# Patient Record
Sex: MALE
[De-identification: names, ages, dates, MRNs, and addresses within clinical notes are randomized per-mention and may not be internally consistent; named-entity substitution may affect disease eponyms.]

---

## 2019-02-26 ENCOUNTER — TRANSCRIPTION ENCOUNTER (OUTPATIENT)
Age: 54
End: 2019-02-26

## 2019-03-06 ENCOUNTER — TRANSCRIPTION ENCOUNTER (OUTPATIENT)
Age: 54
End: 2019-03-06

## 2019-03-10 ENCOUNTER — TRANSCRIPTION ENCOUNTER (OUTPATIENT)
Age: 54
End: 2019-03-10

## 2019-03-16 ENCOUNTER — TRANSCRIPTION ENCOUNTER (OUTPATIENT)
Age: 54
End: 2019-03-16

## 2020-01-07 ENCOUNTER — TRANSCRIPTION ENCOUNTER (OUTPATIENT)
Age: 55
End: 2020-01-07

## 2020-12-08 ENCOUNTER — TRANSCRIPTION ENCOUNTER (OUTPATIENT)
Age: 55
End: 2020-12-08

## 2021-02-28 ENCOUNTER — TRANSCRIPTION ENCOUNTER (OUTPATIENT)
Age: 56
End: 2021-02-28

## 2021-04-24 ENCOUNTER — OUTPATIENT (OUTPATIENT)
Dept: OUTPATIENT SERVICES | Facility: HOSPITAL | Age: 56
LOS: 1 days | End: 2021-04-24
Payer: MEDICARE

## 2021-04-24 DIAGNOSIS — Z20.828 CONTACT WITH AND (SUSPECTED) EXPOSURE TO OTHER VIRAL COMMUNICABLE DISEASES: ICD-10-CM

## 2021-04-24 LAB — SARS-COV-2 RNA SPEC QL NAA+PROBE: SIGNIFICANT CHANGE UP

## 2021-04-24 PROCEDURE — C9803: CPT

## 2021-04-24 PROCEDURE — U0003: CPT

## 2021-04-24 PROCEDURE — U0005: CPT

## 2021-04-25 DIAGNOSIS — Z20.828 CONTACT WITH AND (SUSPECTED) EXPOSURE TO OTHER VIRAL COMMUNICABLE DISEASES: ICD-10-CM

## 2021-07-20 ENCOUNTER — TRANSCRIPTION ENCOUNTER (OUTPATIENT)
Age: 56
End: 2021-07-20

## 2021-09-01 ENCOUNTER — TRANSCRIPTION ENCOUNTER (OUTPATIENT)
Age: 56
End: 2021-09-01

## 2021-09-20 ENCOUNTER — TRANSCRIPTION ENCOUNTER (OUTPATIENT)
Age: 56
End: 2021-09-20

## 2021-10-28 ENCOUNTER — TRANSCRIPTION ENCOUNTER (OUTPATIENT)
Age: 56
End: 2021-10-28

## 2022-03-11 ENCOUNTER — TRANSCRIPTION ENCOUNTER (OUTPATIENT)
Age: 57
End: 2022-03-11

## 2022-04-14 PROBLEM — Z00.00 ENCOUNTER FOR PREVENTIVE HEALTH EXAMINATION: Status: ACTIVE | Noted: 2022-04-14

## 2022-04-19 DIAGNOSIS — M19.019 PRIMARY OSTEOARTHRITIS, UNSPECIFIED SHOULDER: ICD-10-CM

## 2022-06-29 ENCOUNTER — APPOINTMENT (OUTPATIENT)
Dept: ORTHOPEDIC SURGERY | Facility: CLINIC | Age: 57
End: 2022-06-29

## 2022-06-29 PROCEDURE — 72040 X-RAY EXAM NECK SPINE 2-3 VW: CPT

## 2022-06-29 PROCEDURE — 99214 OFFICE O/P EST MOD 30 MIN: CPT

## 2022-06-29 NOTE — HISTORY OF PRESENT ILLNESS
[Neck] : neck [Gradual] : gradual [Sudden] : sudden [6] : 6 [Burning] : burning [Shooting] : shooting [Stabbing] : stabbing [Intermittent] : intermittent [Exercising] : exercising [Full time] : Work status: full time [de-identified] : He is retired [employer] continues with neck pain and tingling down left upper extremity. He is also known to have left shoulder oa with impingement and calcific tendinitis. He does well wit intermittent therapy and recently he has been more sore would like to get back into therapy.\par \par 8/20/18: Here to follow up,. Plan at last visit was "We will get him back into therapy.\par For the neck possible milad if symptoms warrant. for the shoulder discussed the role of prn cortisone if symptoms warrant" Increased paresthesias to bilateral hands recently at night, all 5 digits.\par \par 4/15/19: Here to follow up. Plan at last visit was"Will rpt MRI due to progressing symptoms\par Will get EMG upper extremities to eval paresthesias.\par Continue with PT and follow up after tests." The neck pain and UE symptoms have fluctuated at bit in the - having numbness in the hands - the arms were quite bothersome but the symptoms have subsided a bit - the symptoms persists\par \par MRI c-spine 8/18: Multilevel spondylosis most pronounced at C5-C6 for which there is a diffuse disc bulge\par mildly narrowing the spinal canal and contributing to moderate left and moderate to severe\par right-sided neural foraminal narrowing.\par Severe right-sided neural foraminal at C6-C7 secondary to a right lateral/foraminal focal\par disc herniation\par \par 5/20/19: Here for f/u - plan at last was "Fluctuating symptoms - would order updted MRi C spine and repeat course of PT - Mobic script as well" - overall doing about the same in the regards in the neck - slight tingling in the arms/fingers - has been going to PT for the lower back as well\par \par the lower back is bothering him and shooting down the legs - not responding to PT\par \par MRI C spine - Left-sided disc/ridge complex C3-4 with encroachment upon left C4 nerve root and mild\par spinal stenosis.\par Central disc herniation C4-5 with mild spinal stenosis.\par Spondylitic change C5-6 with cord flattening and moderate spinal stenosis as well as\par marked right and lesser left neuroforaminal compromise. There is suspected right C6 nerve\par root impingement as well as encroachment if not impingement upon the left C6 nerve root.\par Small right foraminal disc herniation C6-7 with possible right C7 nerve root impingement\par \par 10/28/19: Here for fu neck and low back, pain LB is worse, neck is improved. He continues to have pain with activity, rest from PT treatment and Mobic has not helped. No radiating symptoms, b/b dysfunction.\par \par MRI Lumbar spine - [facility] 6/20/19\par Multilevel DDD with HNP, there is a slight progression L5-S1.\par \par 6/27/20: Plan last visit "MRI findings reviewed, will continue PT which has helped him manage symptoms, pain mgmt. consult for possible spine injections; Naprosyn; follow up 4 weeks re-eval."\par \par Had been doing relatively OK, though he hit his neck on his daughter's swing set now with more stiffness and n/t into both hands. Driving difficult. Mobic has helped in the past - Naproxen, not so much. Slight headaches, may have equilibrium issues. The back is status quo.\par \par 7/6/20: Here fu neck - his neck worse and having symptoms down the arms\par \par MRI C spine- Progressive type I Modic endplate changes at C5-C6. Otherwise no change since May 2019.\par There is multilevel cervical spondylosis with findings most notable for moderate central\par stenosis and cord compression at C5-C6, grossly unchanged.\par Multilevel foraminal stenosis with impingement of the bilateral C4, bilateral C6 and right\par C7 nerve roots.\par \par 8/17/20: Here for fu - plan at last was "reviewed the updated MRi with him -MDP/PT\par discussed would rec surgery if not improving with conservative tx" -overall doing better with traction at this point - arms okay - no loss of fine motor or bb control - neck pain to the right shoulder\par \par 10/5/20: Here to follow up. Plan at last visit was "reviewed the updated MRi with him again today- hes doing better with PT - discussion that if not improving rec acdf "\par \par 1/6/21- He remains at his baseline level of pain and discomfort. He notes episodic radicular complaints he has been maintained with 1-2 visits of therapy per week and would like to continue. Has Norco does not need renewal\par He states recently his left shoulder has been more problematic and is requesting injection\par \par 3/31/21: His shoulder symptoms remain unchanged. He states the CSI from last visit was helpful.\par \par 5/19/21: Here for fu - plan at last was shoulder eval - the neck pain remains at this point - saw Dr Huertas and had aspiration - symptoms stable - PT helps\par \par 6/29/22: Here for follow up C-Spine. plan at last "symptoms relatively stable - reviewed the last MRI - fu 6 months - will observer - cont witH pt- MAY need further eval with MRi but would hold on that"\par Today notes intermittent numbness/ tingling sensation has returned in his Left arm at night. Continues HEP. Denies new injury/ new weakness. Interested in resuming PT and getting an updated MRI. \par \par tried yoga \par \par No fine motor loss \par No loss of balance \par \par xrays today:\par C spine - loss of disc hieght at C5-6  [] : Post Surgical Visit: no [FreeTextEntry1] : c spine  [FreeTextEntry7] : down into both arms  [de-identified] : None

## 2022-06-29 NOTE — DISCUSSION/SUMMARY
[de-identified] : indicated for MRI of the C spine \par discussed there could be component of carpal tunnel syndrome \par \par fu to review the MRi

## 2022-06-29 NOTE — PHYSICAL EXAM
[Extension] : extension [Rotation to left] : rotation to left [Rotation to right] : rotation to right [] : no ecchymosis

## 2022-07-26 ENCOUNTER — RESULT REVIEW (OUTPATIENT)
Age: 57
End: 2022-07-26

## 2022-08-26 ENCOUNTER — APPOINTMENT (OUTPATIENT)
Dept: ORTHOPEDIC SURGERY | Facility: CLINIC | Age: 57
End: 2022-08-26

## 2022-08-26 VITALS — HEIGHT: 74 IN | BODY MASS INDEX: 26.31 KG/M2 | WEIGHT: 205 LBS

## 2022-08-26 PROCEDURE — 99214 OFFICE O/P EST MOD 30 MIN: CPT

## 2022-08-26 NOTE — DISCUSSION/SUMMARY
[de-identified] : reviewed the MRi with him \par no significant change \par discussion of surgery - hes neurologically intact - discussion of observatoin risk \par hes going to observe and treat with\par PT/ mobic

## 2022-08-26 NOTE — HISTORY OF PRESENT ILLNESS
[Neck] : neck [Gradual] : gradual [Sudden] : sudden [6] : 6 [Burning] : burning [Shooting] : shooting [Stabbing] : stabbing [Intermittent] : intermittent [Exercising] : exercising [Full time] : Work status: full time [de-identified] : He is retired [employer] continues with neck pain and tingling down left upper extremity. He is also known to have left shoulder oa with impingement and calcific tendinitis. He does well wit intermittent therapy and recently he has been more sore would like to get back into therapy.\par \par 8/20/18: Here to follow up,. Plan at last visit was "We will get him back into therapy.\par For the neck possible milad if symptoms warrant. for the shoulder discussed the role of prn cortisone if symptoms warrant" Increased paresthesias to bilateral hands recently at night, all 5 digits.\par \par 4/15/19: Here to follow up. Plan at last visit was"Will rpt MRI due to progressing symptoms\par Will get EMG upper extremities to eval paresthesias.\par Continue with PT and follow up after tests." The neck pain and UE symptoms have fluctuated at bit in the - having numbness in the hands - the arms were quite bothersome but the symptoms have subsided a bit - the symptoms persists\par \par MRI c-spine 8/18: Multilevel spondylosis most pronounced at C5-C6 for which there is a diffuse disc bulge\par mildly narrowing the spinal canal and contributing to moderate left and moderate to severe\par right-sided neural foraminal narrowing.\par Severe right-sided neural foraminal at C6-C7 secondary to a right lateral/foraminal focal\par disc herniation\par \par 5/20/19: Here for f/u - plan at last was "Fluctuating symptoms - would order updted MRi C spine and repeat course of PT - Mobic script as well" - overall doing about the same in the regards in the neck - slight tingling in the arms/fingers - has been going to PT for the lower back as well\par \par the lower back is bothering him and shooting down the legs - not responding to PT\par \par MRI C spine - Left-sided disc/ridge complex C3-4 with encroachment upon left C4 nerve root and mild\par spinal stenosis.\par Central disc herniation C4-5 with mild spinal stenosis.\par Spondylitic change C5-6 with cord flattening and moderate spinal stenosis as well as\par marked right and lesser left neuroforaminal compromise. There is suspected right C6 nerve\par root impingement as well as encroachment if not impingement upon the left C6 nerve root.\par Small right foraminal disc herniation C6-7 with possible right C7 nerve root impingement\par \par 10/28/19: Here for fu neck and low back, pain LB is worse, neck is improved. He continues to have pain with activity, rest from PT treatment and Mobic has not helped. No radiating symptoms, b/b dysfunction.\par \par MRI Lumbar spine - [facility] 6/20/19\par Multilevel DDD with HNP, there is a slight progression L5-S1.\par \par 6/27/20: Plan last visit "MRI findings reviewed, will continue PT which has helped him manage symptoms, pain mgmt. consult for possible spine injections; Naprosyn; follow up 4 weeks re-eval."\par \par Had been doing relatively OK, though he hit his neck on his daughter's swing set now with more stiffness and n/t into both hands. Driving difficult. Mobic has helped in the past - Naproxen, not so much. Slight headaches, may have equilibrium issues. The back is status quo.\par \par 7/6/20: Here fu neck - his neck worse and having symptoms down the arms\par \par MRI C spine- Progressive type I Modic endplate changes at C5-C6. Otherwise no change since May 2019.\par There is multilevel cervical spondylosis with findings most notable for moderate central\par stenosis and cord compression at C5-C6, grossly unchanged.\par Multilevel foraminal stenosis with impingement of the bilateral C4, bilateral C6 and right\par C7 nerve roots.\par \par 8/17/20: Here for fu - plan at last was "reviewed the updated MRi with him -MDP/PT\par discussed would rec surgery if not improving with conservative tx" -overall doing better with traction at this point - arms okay - no loss of fine motor or bb control - neck pain to the right shoulder\par \par 10/5/20: Here to follow up. Plan at last visit was "reviewed the updated MRi with him again today- hes doing better with PT - discussion that if not improving rec acdf "\par \par 1/6/21- He remains at his baseline level of pain and discomfort. He notes episodic radicular complaints he has been maintained with 1-2 visits of therapy per week and would like to continue. Has Norco does not need renewal\par He states recently his left shoulder has been more problematic and is requesting injection\par \par 3/31/21: His shoulder symptoms remain unchanged. He states the CSI from last visit was helpful.\par \par 5/19/21: Here for fu - plan at last was shoulder eval - the neck pain remains at this point - saw Dr Huertas and had aspiration - symptoms stable - PT helps\par \par 6/29/22: Here for follow up C-Spine. plan at last "symptoms relatively stable - reviewed the last MRI - fu 6 months - will observer - cont witH pt- MAY need further eval with MRi but would hold on that"\par Today notes intermittent numbness/ tingling sensation has returned in his Left arm at night. Continues HEP. Denies new injury/ new weakness. Interested in resuming PT and getting an updated MRI. \par \par tried yoga \par \par No fine motor loss \par No loss of balance \par \par xrays today:\par C spine - loss of disc hieght at C5-6 \par \par 8/26/22: Here for fu - plan at last was "indicated for MRI of the C spine \par discussed there could be component of carpal tunnel syndrome" - overall the pain remains - has some arm issues - not as bad as last time - neck rom is limited\par \par MRi C spine - Straightening of the cervical lordosis.\par Disc bulge with minimal superimposed central disc herniation C3-4. There is\par facet arthropathy and uncovertebral joint degenerative change resulting in left\par with a right foraminal compromise, left C4 nerve root impingement and\par encroachment upon the right C4 nerve root. There is moderate spinal stenosis.\par Small central disc herniation C4-5 with mild spinal stenosis.\par race posterior subluxation and spondylitic change C5/6 with moderate spinal\par stenosis, bilateral foraminal stenosis and encroachment if not impingement upon\par the C6 nerve roots bilaterally.\par Chronic right foraminal disc herniation C6/7 impinging upon the right C7 nerve\par root. [] : Post Surgical Visit: no [FreeTextEntry1] : c spine  [FreeTextEntry7] : down into both arms  [de-identified] : None

## 2022-11-02 ENCOUNTER — NON-APPOINTMENT (OUTPATIENT)
Age: 57
End: 2022-11-02

## 2022-11-27 ENCOUNTER — RX RENEWAL (OUTPATIENT)
Age: 57
End: 2022-11-27

## 2023-03-24 ENCOUNTER — APPOINTMENT (OUTPATIENT)
Dept: ORTHOPEDIC SURGERY | Facility: CLINIC | Age: 58
End: 2023-03-24
Payer: MEDICARE

## 2023-03-24 VITALS — HEIGHT: 74 IN | WEIGHT: 215 LBS | BODY MASS INDEX: 27.59 KG/M2

## 2023-03-24 DIAGNOSIS — M54.12 RADICULOPATHY, CERVICAL REGION: ICD-10-CM

## 2023-03-24 PROCEDURE — 99214 OFFICE O/P EST MOD 30 MIN: CPT

## 2023-03-24 NOTE — HISTORY OF PRESENT ILLNESS
[Neck] : neck [Gradual] : gradual [Sudden] : sudden [6] : 6 [Burning] : burning [Shooting] : shooting [Stabbing] : stabbing [Intermittent] : intermittent [Exercising] : exercising [Full time] : Work status: full time [de-identified] : He is retired [employer] continues with neck pain and tingling down left upper extremity. He is also known to have left shoulder oa with impingement and calcific tendinitis. He does well wit intermittent therapy and recently he has been more sore would like to get back into therapy.\par \par 8/20/18: Here to follow up,. Plan at last visit was "We will get him back into therapy.\par For the neck possible milad if symptoms warrant. for the shoulder discussed the role of prn cortisone if symptoms warrant" Increased paresthesias to bilateral hands recently at night, all 5 digits.\par \par 4/15/19: Here to follow up. Plan at last visit was"Will rpt MRI due to progressing symptoms\par Will get EMG upper extremities to eval paresthesias.\par Continue with PT and follow up after tests." The neck pain and UE symptoms have fluctuated at bit in the - having numbness in the hands - the arms were quite bothersome but the symptoms have subsided a bit - the symptoms persists\par \par MRI c-spine 8/18: Multilevel spondylosis most pronounced at C5-C6 for which there is a diffuse disc bulge\par mildly narrowing the spinal canal and contributing to moderate left and moderate to severe\par right-sided neural foraminal narrowing.\par Severe right-sided neural foraminal at C6-C7 secondary to a right lateral/foraminal focal\par disc herniation\par \par 5/20/19: Here for f/u - plan at last was "Fluctuating symptoms - would order updted MRi C spine and repeat course of PT - Mobic script as well" - overall doing about the same in the regards in the neck - slight tingling in the arms/fingers - has been going to PT for the lower back as well\par \par the lower back is bothering him and shooting down the legs - not responding to PT\par \par MRI C spine - Left-sided disc/ridge complex C3-4 with encroachment upon left C4 nerve root and mild\par spinal stenosis.\par Central disc herniation C4-5 with mild spinal stenosis.\par Spondylitic change C5-6 with cord flattening and moderate spinal stenosis as well as\par marked right and lesser left neuroforaminal compromise. There is suspected right C6 nerve\par root impingement as well as encroachment if not impingement upon the left C6 nerve root.\par Small right foraminal disc herniation C6-7 with possible right C7 nerve root impingement\par \par 10/28/19: Here for fu neck and low back, pain LB is worse, neck is improved. He continues to have pain with activity, rest from PT treatment and Mobic has not helped. No radiating symptoms, b/b dysfunction.\par \par MRI Lumbar spine - [facility] 6/20/19\par Multilevel DDD with HNP, there is a slight progression L5-S1.\par \par 6/27/20: Plan last visit "MRI findings reviewed, will continue PT which has helped him manage symptoms, pain mgmt. consult for possible spine injections; Naprosyn; follow up 4 weeks re-eval."\par \par Had been doing relatively OK, though he hit his neck on his daughter's swing set now with more stiffness and n/t into both hands. Driving difficult. Mobic has helped in the past - Naproxen, not so much. Slight headaches, may have equilibrium issues. The back is status quo.\par \par 7/6/20: Here fu neck - his neck worse and having symptoms down the arms\par \par MRI C spine- Progressive type I Modic endplate changes at C5-C6. Otherwise no change since May 2019.\par There is multilevel cervical spondylosis with findings most notable for moderate central\par stenosis and cord compression at C5-C6, grossly unchanged.\par Multilevel foraminal stenosis with impingement of the bilateral C4, bilateral C6 and right\par C7 nerve roots.\par \par 8/17/20: Here for fu - plan at last was "reviewed the updated MRi with him -MDP/PT\par discussed would rec surgery if not improving with conservative tx" -overall doing better with traction at this point - arms okay - no loss of fine motor or bb control - neck pain to the right shoulder\par \par 10/5/20: Here to follow up. Plan at last visit was "reviewed the updated MRi with him again today- hes doing better with PT - discussion that if not improving rec acdf "\par \par 1/6/21- He remains at his baseline level of pain and discomfort. He notes episodic radicular complaints he has been maintained with 1-2 visits of therapy per week and would like to continue. Has Norco does not need renewal\par He states recently his left shoulder has been more problematic and is requesting injection\par \par 3/31/21: His shoulder symptoms remain unchanged. He states the CSI from last visit was helpful.\par \par 5/19/21: Here for fu - plan at last was shoulder eval - the neck pain remains at this point - saw Dr Huertas and had aspiration - symptoms stable - PT helps\par \par 6/29/22: Here for follow up C-Spine. plan at last "symptoms relatively stable - reviewed the last MRI - fu 6 months - will observer - cont witH pt- MAY need further eval with MRi but would hold on that"\par Today notes intermittent numbness/ tingling sensation has returned in his Left arm at night. Continues HEP. Denies new injury/ new weakness. Interested in resuming PT and getting an updated MRI. \par \par tried yoga \par \par No fine motor loss \par No loss of balance \par \par xrays today:\par C spine - loss of disc hieght at C5-6 \par \par 8/26/22: Here for fu - plan at last was "indicated for MRI of the C spine \par discussed there could be component of carpal tunnel syndrome" - overall the pain remains - has some arm issues - not as bad as last time - neck rom is limited\par \par MRi C spine - Straightening of the cervical lordosis.\par Disc bulge with minimal superimposed central disc herniation C3-4. There is\par facet arthropathy and uncovertebral joint degenerative change resulting in left\par with a right foraminal compromise, left C4 nerve root impingement and\par encroachment upon the right C4 nerve root. There is moderate spinal stenosis.\par Small central disc herniation C4-5 with mild spinal stenosis.\par race posterior subluxation and spondylitic change C5/6 with moderate spinal\par stenosis, bilateral foraminal stenosis and encroachment if not impingement upon\par the C6 nerve roots bilaterally.\par Chronic right foraminal disc herniation C6/7 impinging upon the right C7 nerve\par root.\par \par 3/24/23: here for pain on the right side of the back of the neck\par hasn’t been to PT in a while\par mobic helps a bit  [] : Post Surgical Visit: no [FreeTextEntry1] : c spine  [FreeTextEntry5] : Pt is here for c-spine follow up. Pain comes and goes, about the same as last visit. Pain is now on the right side of the neck.  [FreeTextEntry7] : down into both arms  [de-identified] : Meloxicam, PT

## 2023-03-24 NOTE — DISCUSSION/SUMMARY
[de-identified] : reviewed the MRi with him and the case \par symptoms relatively static \par discussion of surgery - hes neurologically intact - discussion of observatoin risk \par hes going to observe and treat with\par PT/diclofenac\par fu 3 months

## 2023-04-25 ENCOUNTER — RX RENEWAL (OUTPATIENT)
Age: 58
End: 2023-04-25

## 2023-04-25 RX ORDER — DICLOFENAC SODIUM 75 MG/1
75 TABLET, DELAYED RELEASE ORAL
Qty: 60 | Refills: 0 | Status: ACTIVE | COMMUNITY
Start: 2023-03-24 | End: 1900-01-01

## 2023-07-14 ENCOUNTER — APPOINTMENT (OUTPATIENT)
Dept: ORTHOPEDIC SURGERY | Facility: CLINIC | Age: 58
End: 2023-07-14

## 2023-09-27 ENCOUNTER — APPOINTMENT (OUTPATIENT)
Dept: ORTHOPEDIC SURGERY | Facility: CLINIC | Age: 58
End: 2023-09-27
Payer: MEDICARE

## 2023-09-27 VITALS — WEIGHT: 215 LBS | HEIGHT: 74 IN | BODY MASS INDEX: 27.59 KG/M2

## 2023-09-27 PROCEDURE — 73030 X-RAY EXAM OF SHOULDER: CPT | Mod: LT

## 2023-09-27 PROCEDURE — J3490M: CUSTOM | Mod: NC

## 2023-09-27 PROCEDURE — 20611 DRAIN/INJ JOINT/BURSA W/US: CPT | Mod: LT

## 2023-09-27 PROCEDURE — 99214 OFFICE O/P EST MOD 30 MIN: CPT | Mod: 25

## 2023-09-27 PROCEDURE — 73010 X-RAY EXAM OF SHOULDER BLADE: CPT | Mod: LT

## 2023-10-18 ENCOUNTER — APPOINTMENT (OUTPATIENT)
Dept: ORTHOPEDIC SURGERY | Facility: CLINIC | Age: 58
End: 2023-10-18

## 2023-11-25 ENCOUNTER — NON-APPOINTMENT (OUTPATIENT)
Age: 58
End: 2023-11-25

## 2023-12-14 ENCOUNTER — APPOINTMENT (OUTPATIENT)
Dept: ORTHOPEDIC SURGERY | Facility: CLINIC | Age: 58
End: 2023-12-14
Payer: MEDICARE

## 2023-12-14 ENCOUNTER — RESULT CHARGE (OUTPATIENT)
Age: 58
End: 2023-12-14

## 2023-12-14 DIAGNOSIS — M54.2 CERVICALGIA: ICD-10-CM

## 2023-12-14 DIAGNOSIS — M62.838 OTHER MUSCLE SPASM: ICD-10-CM

## 2023-12-14 DIAGNOSIS — M50.20 OTHER CERVICAL DISC DISPLACEMENT, UNSPECIFIED CERVICAL REGION: ICD-10-CM

## 2023-12-14 DIAGNOSIS — G25.89 OTHER SPECIFIED EXTRAPYRAMIDAL AND MOVEMENT DISORDERS: ICD-10-CM

## 2023-12-14 PROCEDURE — 73030 X-RAY EXAM OF SHOULDER: CPT | Mod: RT

## 2023-12-14 PROCEDURE — 72040 X-RAY EXAM NECK SPINE 2-3 VW: CPT

## 2023-12-14 PROCEDURE — 99213 OFFICE O/P EST LOW 20 MIN: CPT

## 2023-12-14 PROCEDURE — 73010 X-RAY EXAM OF SHOULDER BLADE: CPT | Mod: RT

## 2023-12-14 RX ORDER — CYCLOBENZAPRINE HYDROCHLORIDE 5 MG/1
5 TABLET, FILM COATED ORAL
Qty: 30 | Refills: 0 | Status: ACTIVE | COMMUNITY
Start: 2023-12-14 | End: 1900-01-01

## 2023-12-14 NOTE — DISCUSSION/SUMMARY
[de-identified] : 59 yo m with right scapular dyskenesis and rhomboid spasm x 10 days 1) PT encouraged 2) Rx for naproxen and cyclobenzaprine - do not drive after taking this medication 3) Moist heat

## 2023-12-14 NOTE — IMAGING
[Disc space narrowing] : Disc space narrowing [Right] : right shoulder [There are no fractures, subluxations or dislocations. No significant abnormalities are seen] : There are no fractures, subluxations or dislocations. No significant abnormalities are seen [FreeTextEntry1] : Loss of disc height at C5-C6

## 2023-12-14 NOTE — HISTORY OF PRESENT ILLNESS
[8] : 8 [Dull/Aching] : dull/aching [Tingling] : tingling [Meds] : meds [de-identified] : 12/14/23: 57 yo RHD M with posterior right shoulder and neck pain ongoing 10 days ago after hanging cynthia lights.  Thinks there could have been cumulitive things that could have caused his pain.   Today, the worst pain occurred to the right shoulder blade. There is tingling to the right shoulder.  He tried Mobic today without relief. Also ice pack/theragun.  He has been following Dr Benz for chronic neck issues over the years, gets PT periodically.   He is retired due to a work injury. [] : no [FreeTextEntry5] : CLARITA VEILZ a 58 year old male here for evaluation of right shoulder. Patient states he has been having pain in the shoulder for 1 week. States pain is radiating down his shoulder blade. Has a hx of neck issues unsure if it's coming from his neck..

## 2024-01-03 ENCOUNTER — APPOINTMENT (OUTPATIENT)
Dept: ORTHOPEDIC SURGERY | Facility: CLINIC | Age: 59
End: 2024-01-03
Payer: MEDICARE

## 2024-01-03 VITALS — WEIGHT: 215 LBS | HEIGHT: 74 IN | BODY MASS INDEX: 27.59 KG/M2

## 2024-01-03 PROCEDURE — J3490M: CUSTOM | Mod: NC

## 2024-01-03 PROCEDURE — 20610 DRAIN/INJ JOINT/BURSA W/O US: CPT | Mod: RT

## 2024-01-03 PROCEDURE — 99213 OFFICE O/P EST LOW 20 MIN: CPT | Mod: 25

## 2024-01-03 RX ORDER — MELOXICAM 15 MG/1
15 TABLET ORAL
Qty: 30 | Refills: 2 | Status: ACTIVE | COMMUNITY
Start: 2022-08-26 | End: 1900-01-01

## 2024-01-03 NOTE — PHYSICAL EXAM
[Extension] : extension [Rotation to left] : rotation to left [Rotation to right] : rotation to right [Right] : right shoulder [Sitting] : sitting [Moderate] : moderate [5 ___] : forward flexion 5[unfilled]/5 [5___] : external rotation 5[unfilled]/5 [] : no ecchymosis [TWNoteComboBox4] : passive forward flexion 170 degrees [TWNoteComboBox6] : internal rotation L1 [de-identified] : external rotation 70 degrees

## 2024-01-03 NOTE — DISCUSSION/SUMMARY
[de-identified] : seems to have overlapping cervical radiculopathy as well as right shoulder impingement/calfcific tendonitis most pain to the right anterior shoulder today CSI R shoulder tolerated well MRI C-spine indicated Mobic renewed fu after cervical MRI for review fu with Dr Mercado for the right shoulder   Patient seen by "Marylin LUNA" under the supervision of "Dr. Remberto Benz"

## 2024-01-03 NOTE — HISTORY OF PRESENT ILLNESS
[Neck] : neck [Gradual] : gradual [Sudden] : sudden [6] : 6 [Burning] : burning [Shooting] : shooting [Stabbing] : stabbing [Intermittent] : intermittent [Exercising] : exercising [Full time] : Work status: full time [Tingling] : tingling [Nothing helps with pain getting better] : Nothing helps with pain getting better [de-identified] : He is retired [employer] continues with neck pain and tingling down left upper extremity. He is also known to have left shoulder oa with impingement and calcific tendinitis. He does well wit intermittent therapy and recently he has been more sore would like to get back into therapy.  8/20/18: Here to follow up,. Plan at last visit was "We will get him back into therapy. For the neck possible milad if symptoms warrant. for the shoulder discussed the role of prn cortisone if symptoms warrant" Increased paresthesias to bilateral hands recently at night, all 5 digits.  4/15/19: Here to follow up. Plan at last visit was"Will rpt MRI due to progressing symptoms Will get EMG upper extremities to eval paresthesias. Continue with PT and follow up after tests." The neck pain and UE symptoms have fluctuated at bit in the - having numbness in the hands - the arms were quite bothersome but the symptoms have subsided a bit - the symptoms persists  MRI c-spine 8/18: Multilevel spondylosis most pronounced at C5-C6 for which there is a diffuse disc bulge mildly narrowing the spinal canal and contributing to moderate left and moderate to severe right-sided neural foraminal narrowing. Severe right-sided neural foraminal at C6-C7 secondary to a right lateral/foraminal focal disc herniation  5/20/19: Here for f/u - plan at last was "Fluctuating symptoms - would order updted MRi C spine and repeat course of PT - Mobic script as well" - overall doing about the same in the regards in the neck - slight tingling in the arms/fingers - has been going to PT for the lower back as well  the lower back is bothering him and shooting down the legs - not responding to PT  MRI C spine - Left-sided disc/ridge complex C3-4 with encroachment upon left C4 nerve root and mild spinal stenosis. Central disc herniation C4-5 with mild spinal stenosis. Spondylitic change C5-6 with cord flattening and moderate spinal stenosis as well as marked right and lesser left neuroforaminal compromise. There is suspected right C6 nerve root impingement as well as encroachment if not impingement upon the left C6 nerve root. Small right foraminal disc herniation C6-7 with possible right C7 nerve root impingement  10/28/19: Here for fu neck and low back, pain LB is worse, neck is improved. He continues to have pain with activity, rest from PT treatment and Mobic has not helped. No radiating symptoms, b/b dysfunction.  MRI Lumbar spine - [facility] 6/20/19 Multilevel DDD with HNP, there is a slight progression L5-S1.  6/27/20: Plan last visit "MRI findings reviewed, will continue PT which has helped him manage symptoms, pain mgmt. consult for possible spine injections; Naprosyn; follow up 4 weeks re-eval."  Had been doing relatively OK, though he hit his neck on his daughter's swing set now with more stiffness and n/t into both hands. Driving difficult. Mobic has helped in the past - Naproxen, not so much. Slight headaches, may have equilibrium issues. The back is status quo.  7/6/20: Here fu neck - his neck worse and having symptoms down the arms  MRI C spine- Progressive type I Modic endplate changes at C5-C6. Otherwise no change since May 2019. There is multilevel cervical spondylosis with findings most notable for moderate central stenosis and cord compression at C5-C6, grossly unchanged. Multilevel foraminal stenosis with impingement of the bilateral C4, bilateral C6 and right C7 nerve roots.  8/17/20: Here for fu - plan at last was "reviewed the updated MRi with him -MDP/PT discussed would rec surgery if not improving with conservative tx" -overall doing better with traction at this point - arms okay - no loss of fine motor or bb control - neck pain to the right shoulder  10/5/20: Here to follow up. Plan at last visit was "reviewed the updated MRi with him again today- hes doing better with PT - discussion that if not improving rec acdf "  1/6/21- He remains at his baseline level of pain and discomfort. He notes episodic radicular complaints he has been maintained with 1-2 visits of therapy per week and would like to continue. Has Norco does not need renewal He states recently his left shoulder has been more problematic and is requesting injection  3/31/21: His shoulder symptoms remain unchanged. He states the CSI from last visit was helpful.  5/19/21: Here for fu - plan at last was shoulder eval - the neck pain remains at this point - saw Dr Huertas and had aspiration - symptoms stable - PT helps  6/29/22: Here for follow up C-Spine. plan at last "symptoms relatively stable - reviewed the last MRI - fu 6 months - will observer - cont witH pt- MAY need further eval with MRi but would hold on that" Today notes intermittent numbness/ tingling sensation has returned in his Left arm at night. Continues HEP. Denies new injury/ new weakness. Interested in resuming PT and getting an updated MRI.   tried yoga   No fine motor loss  No loss of balance   xrays today: C spine - loss of disc hieght at C5-6   8/26/22: Here for fu - plan at last was "indicated for MRI of the C spine  discussed there could be component of carpal tunnel syndrome" - overall the pain remains - has some arm issues - not as bad as last time - neck rom is limited  MRi C spine - Straightening of the cervical lordosis. Disc bulge with minimal superimposed central disc herniation C3-4. There is facet arthropathy and uncovertebral joint degenerative change resulting in left with a right foraminal compromise, left C4 nerve root impingement and encroachment upon the right C4 nerve root. There is moderate spinal stenosis. Small central disc herniation C4-5 with mild spinal stenosis. race posterior subluxation and spondylitic change C5/6 with moderate spinal stenosis, bilateral foraminal stenosis and encroachment if not impingement upon the C6 nerve roots bilaterally. Chronic right foraminal disc herniation C6/7 impinging upon the right C7 nerve root.  3/24/23: here for pain on the right side of the back of the neck hasn't been to PT in a while mobic helps a bit   01/03/24: Here for fu on the neck; continued pain and is now localized towards the anterior shoulder that woke him up. He saw Dr Mercado for cervical spine symptoms where he was given flexeril without relief.   xrays reviewed from 12/14/23 C-spine 2v; cervical spondylosis and loss of disc height left shoulder 4v: calcific density  [] : Post Surgical Visit: no [FreeTextEntry1] : c spine  [FreeTextEntry5] : Pt is here for c-spine follow up. Pain comes and goes, about the same as last visit. Pain is now on the right side of the neck.  [FreeTextEntry7] : down into both arms  [de-identified] : Meloxicam, PT flexeril

## 2024-01-03 NOTE — PROCEDURE
[Large Joint Injection] : Large joint injection [Right] : of the right [Subacromial Space] : subacromial space [Pain] : pain [Inflammation] : inflammation [Alcohol] : alcohol [Betadine] : betadine [Ethyl Chloride sprayed topically] : ethyl chloride sprayed topically [Sterile technique used] : sterile technique used [___ cc    1%] : Lidocaine ~Vcc of 1%  [___ cc    0.25%] : Bupivacaine (Marcaine) ~Vcc of 0.25%  [___ cc    10mg] : Triamcinolone (Kenalog) ~Vcc of 10 mg  [] : Patient tolerated procedure well [Call if redness, pain or fever occur] : call if redness, pain or fever occur [Apply ice for 15min out of every hour for the next 12-24 hours as tolerated] : apply ice for 15 minutes out of every hour for the next 12-24 hours as tolerated [Patient was advised to rest the joint(s) for ____ days] : patient was advised to rest the joint(s) for [unfilled] days [Previous OTC use and PT nontherapeutic] : patient has tried OTC's including aspirin, Ibuprofen, Aleve, etc or prescription NSAIDS, and/or exercises at home and/or physical therapy without satisfactory response [Patient had decreased mobility in the joint] : patient had decreased mobility in the joint [Risks, benefits, alternatives discussed / Verbal consent obtained] : the risks benefits, and alternatives have been discussed, and verbal consent was obtained

## 2024-01-04 ENCOUNTER — RESULT REVIEW (OUTPATIENT)
Age: 59
End: 2024-01-04

## 2024-01-10 ENCOUNTER — APPOINTMENT (OUTPATIENT)
Dept: ORTHOPEDIC SURGERY | Facility: CLINIC | Age: 59
End: 2024-01-10

## 2024-01-24 ENCOUNTER — APPOINTMENT (OUTPATIENT)
Dept: ORTHOPEDIC SURGERY | Facility: CLINIC | Age: 59
End: 2024-01-24
Payer: MEDICARE

## 2024-01-24 VITALS — WEIGHT: 215 LBS | HEIGHT: 74 IN | BODY MASS INDEX: 27.59 KG/M2

## 2024-01-24 DIAGNOSIS — M75.31 CALCIFIC TENDINITIS OF RIGHT SHOULDER: ICD-10-CM

## 2024-01-24 DIAGNOSIS — M48.02 SPINAL STENOSIS, CERVICAL REGION: ICD-10-CM

## 2024-01-24 PROCEDURE — 99214 OFFICE O/P EST MOD 30 MIN: CPT

## 2024-01-24 RX ORDER — METHYLPREDNISOLONE 4 MG/1
4 TABLET ORAL
Qty: 1 | Refills: 0 | Status: ACTIVE | COMMUNITY
Start: 2024-01-24 | End: 1900-01-01

## 2024-01-24 NOTE — DATA REVIEWED
[MRI] : MRI [Cervical Spine] : cervical spine [Report was reviewed and noted in the chart] : The report was reviewed and noted in the chart [FreeTextEntry1] : 01.04.24 (ZP)  1. At C3-C4 disc osteophytic ridging is slightly more pronounced than on the prior exam. There is moderate thecal sac compression. There is moderate to severe left and moderate right foraminal stenosis. 2. At C4-C5, a shallow central disc protrusion is stable. 3. At C5-C6 there is a disc protrusion along with disc osteophytic ridging resulting in Moderate to severe thecal sac compression and mild mass effect on the spinal cord. There is severe foraminal stenosis. Findings are stable. 4. At C6-C7, a right paracentral disc protrusion again extends into the right neural foramen. There is moderate to severe right foraminal stenosis and impingement of the right lateral recess.

## 2024-01-24 NOTE — PHYSICAL EXAM
[Flexion] : flexion [Extension] : extension [Rotation to left] : rotation to left [Rotation to right] : rotation to right [Right] : right shoulder [] : motor and sensory intact distally [de-identified] : external rotation at 90 degrees of abduction 90 degrees [TWNoteComboBox5] : internal rotation at 90 degrees of abduction 85 degrees

## 2024-01-24 NOTE — HISTORY OF PRESENT ILLNESS
[de-identified] : 1/24/24: here to f/up right shoulder and neck. Saw Dr. Benz and had CSI in shoulder (1/3/24), had C-Spine MRI, going to PT. pain is improving having more weakness, n/t and pain radiating throughout the RUE.  12/14/23: 59 yo RHD M with posterior right shoulder and neck pain ongoing 10 days ago after hanging cynthia lights.  Thinks there could have been cumulitive things that could have caused his pain.   Today, the worst pain occurred to the right shoulder blade. There is tingling to the right shoulder.  He tried Mobic today without relief. Also ice pack/theragun.  He has been following Dr Benz for chronic neck issues over the years, gets PT periodically.   He is retired due to a work injury.

## 2024-01-24 NOTE — DISCUSSION/SUMMARY
[de-identified] : 58m with right shoulder calcific tendinitis and cervical radicular complaints. Complaints and exam today are more consistent with being of cervical origin, do not think that the right shoulder calcium deposit is currently a pain generator.  1) MDP rx 2) c/w to f/up with Dr. Benz for his neck  3) can f/up after treatment to the neck if any shoulder pain persists  Entered by Brittney Justice acting as scribe. Dr. Mercado- The documentation recorded by the scribe accurately reflects the service I personally performed and the decisions made by me.

## 2024-01-31 ENCOUNTER — APPOINTMENT (OUTPATIENT)
Dept: ORTHOPEDIC SURGERY | Facility: CLINIC | Age: 59
End: 2024-01-31
Payer: MEDICARE

## 2024-01-31 DIAGNOSIS — Z78.9 OTHER SPECIFIED HEALTH STATUS: ICD-10-CM

## 2024-01-31 DIAGNOSIS — M50.30 OTHER CERVICAL DISC DEGENERATION, UNSPECIFIED CERVICAL REGION: ICD-10-CM

## 2024-01-31 DIAGNOSIS — M75.42 IMPINGEMENT SYNDROME OF LEFT SHOULDER: ICD-10-CM

## 2024-01-31 PROCEDURE — 99214 OFFICE O/P EST MOD 30 MIN: CPT

## 2024-02-01 ENCOUNTER — RX RENEWAL (OUTPATIENT)
Age: 59
End: 2024-02-01

## 2024-02-01 RX ORDER — NAPROXEN 500 MG/1
500 TABLET ORAL
Qty: 60 | Refills: 0 | Status: ACTIVE | COMMUNITY
Start: 2023-12-14 | End: 1900-01-01

## 2024-02-01 NOTE — HISTORY OF PRESENT ILLNESS
[Neck] : neck [Gradual] : gradual [Sudden] : sudden [6] : 6 [Burning] : burning [Shooting] : shooting [Stabbing] : stabbing [Tingling] : tingling [Intermittent] : intermittent [Nothing helps with pain getting better] : Nothing helps with pain getting better [Exercising] : exercising [Full time] : Work status: full time [de-identified] : He is retired [employer] continues with neck pain and tingling down left upper extremity. He is also known to have left shoulder oa with impingement and calcific tendinitis. He does well wit intermittent therapy and recently he has been more sore would like to get back into therapy.  8/20/18: Here to follow up,. Plan at last visit was "We will get him back into therapy. For the neck possible milad if symptoms warrant. for the shoulder discussed the role of prn cortisone if symptoms warrant" Increased paresthesias to bilateral hands recently at night, all 5 digits.  4/15/19: Here to follow up. Plan at last visit was"Will rpt MRI due to progressing symptoms Will get EMG upper extremities to eval paresthesias. Continue with PT and follow up after tests." The neck pain and UE symptoms have fluctuated at bit in the - having numbness in the hands - the arms were quite bothersome but the symptoms have subsided a bit - the symptoms persists  MRI c-spine 8/18: Multilevel spondylosis most pronounced at C5-C6 for which there is a diffuse disc bulge mildly narrowing the spinal canal and contributing to moderate left and moderate to severe right-sided neural foraminal narrowing. Severe right-sided neural foraminal at C6-C7 secondary to a right lateral/foraminal focal disc herniation  5/20/19: Here for f/u - plan at last was "Fluctuating symptoms - would order updted MRi C spine and repeat course of PT - Mobic script as well" - overall doing about the same in the regards in the neck - slight tingling in the arms/fingers - has been going to PT for the lower back as well  the lower back is bothering him and shooting down the legs - not responding to PT  MRI C spine - Left-sided disc/ridge complex C3-4 with encroachment upon left C4 nerve root and mild spinal stenosis. Central disc herniation C4-5 with mild spinal stenosis. Spondylitic change C5-6 with cord flattening and moderate spinal stenosis as well as marked right and lesser left neuroforaminal compromise. There is suspected right C6 nerve root impingement as well as encroachment if not impingement upon the left C6 nerve root. Small right foraminal disc herniation C6-7 with possible right C7 nerve root impingement  10/28/19: Here for fu neck and low back, pain LB is worse, neck is improved. He continues to have pain with activity, rest from PT treatment and Mobic has not helped. No radiating symptoms, b/b dysfunction.  MRI Lumbar spine - [facility] 6/20/19 Multilevel DDD with HNP, there is a slight progression L5-S1.  6/27/20: Plan last visit "MRI findings reviewed, will continue PT which has helped him manage symptoms, pain mgmt. consult for possible spine injections; Naprosyn; follow up 4 weeks re-eval."  Had been doing relatively OK, though he hit his neck on his daughter's swing set now with more stiffness and n/t into both hands. Driving difficult. Mobic has helped in the past - Naproxen, not so much. Slight headaches, may have equilibrium issues. The back is status quo.  7/6/20: Here fu neck - his neck worse and having symptoms down the arms  MRI C spine- Progressive type I Modic endplate changes at C5-C6. Otherwise no change since May 2019. There is multilevel cervical spondylosis with findings most notable for moderate central stenosis and cord compression at C5-C6, grossly unchanged. Multilevel foraminal stenosis with impingement of the bilateral C4, bilateral C6 and right C7 nerve roots.  8/17/20: Here for fu - plan at last was "reviewed the updated MRi with him -MDP/PT discussed would rec surgery if not improving with conservative tx" -overall doing better with traction at this point - arms okay - no loss of fine motor or bb control - neck pain to the right shoulder  10/5/20: Here to follow up. Plan at last visit was "reviewed the updated MRi with him again today- hes doing better with PT - discussion that if not improving rec acdf "  1/6/21- He remains at his baseline level of pain and discomfort. He notes episodic radicular complaints he has been maintained with 1-2 visits of therapy per week and would like to continue. Has Norco does not need renewal He states recently his left shoulder has been more problematic and is requesting injection  3/31/21: His shoulder symptoms remain unchanged. He states the CSI from last visit was helpful.  5/19/21: Here for fu - plan at last was shoulder eval - the neck pain remains at this point - saw Dr Huertas and had aspiration - symptoms stable - PT helps  6/29/22: Here for follow up C-Spine. plan at last "symptoms relatively stable - reviewed the last MRI - fu 6 months - will observer - cont witH pt- MAY need further eval with MRi but would hold on that" Today notes intermittent numbness/ tingling sensation has returned in his Left arm at night. Continues HEP. Denies new injury/ new weakness. Interested in resuming PT and getting an updated MRI.   tried yoga   No fine motor loss  No loss of balance   xrays today: C spine - loss of disc hieght at C5-6   8/26/22: Here for fu - plan at last was "indicated for MRI of the C spine  discussed there could be component of carpal tunnel syndrome" - overall the pain remains - has some arm issues - not as bad as last time - neck rom is limited  MRi C spine - Straightening of the cervical lordosis. Disc bulge with minimal superimposed central disc herniation C3-4. There is facet arthropathy and uncovertebral joint degenerative change resulting in left with a right foraminal compromise, left C4 nerve root impingement and encroachment upon the right C4 nerve root. There is moderate spinal stenosis. Small central disc herniation C4-5 with mild spinal stenosis. race posterior subluxation and spondylitic change C5/6 with moderate spinal stenosis, bilateral foraminal stenosis and encroachment if not impingement upon the C6 nerve roots bilaterally. Chronic right foraminal disc herniation C6/7 impinging upon the right C7 nerve root.  3/24/23: here for pain on the right side of the back of the neck hasn't been to PT in a while mobic helps a bit   01/03/24: Here for fu on the neck; continued pain and is now localized towards the anterior shoulder that woke him up. Plan at last was " He saw Dr Mercado for cervical spine symptoms where he was given flexeril without relief.   xrays reviewed from 12/14/23 C-spine 2v; cervical spondylosis and loss of disc height left shoulder 4v: calcific density   01/31/24 here to review mri results on the neck feeling a little better - plan at last was "seems to have overlapping cervical radiculopathy as well as right shoulder impingement/calfcific tendonitis most pain to the right anterior shoulder today CSI R shoulder tolerated well MRI C-spine indicated Mobic renewed fu after cervical MRI for review fu with Dr Mercado for the right shoulder" - overall most of jen pain in the right shoulder - left arm is okay - pain with pushing motion of the shoudler   MRi Cspine -  At C3-C4 disc osteophytic ridging is slightly more pronounced than on the prior exam. There is moderate thecal sac compression. There is moderate to severe left and moderate right foraminal stenosis. At C4-C5, a shallow central disc protrusion is stable. At C5-C6 there is a disc protrusion along with disc osteophytic ridging resulting in Moderate to severe thecal sac compression and mild mass effect on the spinal cord. There is severe foraminal stenosis. Findings are stable. At C6-C7, a right paracentral disc protrusion again extends into the right neural foramen. There is moderate to severe right foraminal stenosis and impingement of the right lateral recess. [] : Post Surgical Visit: no [FreeTextEntry1] : c spine  [FreeTextEntry5] : Pt is here for c-spine follow up. Pain comes and goes, about the same as last visit. Pain is now on the right side of the neck.  [FreeTextEntry7] : down into both arms  [de-identified] : mri done at [facility]  [de-identified] : Meloxicam, PT flexeril

## 2024-02-01 NOTE — PHYSICAL EXAM
[Extension] : extension [Rotation to left] : rotation to left [Rotation to right] : rotation to right [Right] : right shoulder [Sitting] : sitting [Moderate] : moderate [5 ___] : forward flexion 5[unfilled]/5 [5___] : external rotation 5[unfilled]/5 [] : no ecchymosis [TWNoteComboBox4] : passive forward flexion 170 degrees [TWNoteComboBox6] : internal rotation L1 [de-identified] : external rotation 70 degrees

## 2024-02-01 NOTE — DISCUSSION/SUMMARY
[Surgical risks reviewed] : Surgical risks reviewed [Medication Risks Reviewed] : Medication risks reviewed [de-identified] : reviewed the case and the imaging - we compared to the older imaging as well  cervical imaging stable  compared to the old imaging  neurologically stable imaging stable  fu in 6 months   we discussed milad consider surgery if getting worse would be acdf surgery - we discussed r/b/e of this

## 2024-06-26 ENCOUNTER — APPOINTMENT (OUTPATIENT)
Dept: ORTHOPEDIC SURGERY | Facility: CLINIC | Age: 59
End: 2024-06-26

## 2024-08-14 ENCOUNTER — APPOINTMENT (OUTPATIENT)
Dept: ORTHOPEDIC SURGERY | Facility: CLINIC | Age: 59
End: 2024-08-14
Payer: MEDICARE

## 2024-08-14 VITALS — BODY MASS INDEX: 27.59 KG/M2 | HEIGHT: 74 IN | WEIGHT: 215 LBS

## 2024-08-14 DIAGNOSIS — G25.89 OTHER SPECIFIED EXTRAPYRAMIDAL AND MOVEMENT DISORDERS: ICD-10-CM

## 2024-08-14 DIAGNOSIS — M62.838 OTHER MUSCLE SPASM: ICD-10-CM

## 2024-08-14 DIAGNOSIS — M54.12 RADICULOPATHY, CERVICAL REGION: ICD-10-CM

## 2024-08-14 PROCEDURE — 99214 OFFICE O/P EST MOD 30 MIN: CPT

## 2024-08-14 RX ORDER — MELOXICAM 7.5 MG/1
7.5 TABLET ORAL
Qty: 30 | Refills: 2 | Status: ACTIVE | COMMUNITY
Start: 2024-08-14 | End: 1900-01-01

## 2024-08-14 NOTE — DISCUSSION/SUMMARY
[de-identified] : 58m cervical / trapezius spasm and scapular dyskensia 1) resume PT / trial acupuncture 2) heat, cryotherapy, rest and activity modification  3)  meloxicam rx - gi precautions reviewed 4) rtc 6 weeks   Entered by Brtitney Justice acting as scribe. Dr. Mercado- The documentation recorded by the scribe accurately reflects the service I personally performed and the decisions made by me.

## 2024-08-14 NOTE — PHYSICAL EXAM
[Right] : right shoulder [NL (0-180)] : full active abduction 0-180 degrees [] : negative Speed's [de-identified] : external rotation at 90 degrees of abduction 90 degrees [TWNoteComboBox5] : internal rotation at 90 degrees of abduction 85 degrees

## 2024-08-14 NOTE — HISTORY OF PRESENT ILLNESS
[de-identified] : 8/14/24: here to f/up right shoulder and neck. Was feeling better until pain returned around 7/24/24 with no new KAREN. Not currently in PT. 1/24/24: here to f/up right shoulder and neck. Saw Dr. Benz and had CSI in shoulder (1/3/24), had C-Spine MRI, going to PT. pain is improving having more weakness, n/t and pain radiating throughout the RUE.  12/14/23: 59 yo RHD M with posterior right shoulder and neck pain ongoing 10 days ago after hanging cynthia lights.  Thinks there could have been cumulitive things that could have caused his pain.   Today, the worst pain occurred to the right shoulder blade. There is tingling to the right shoulder.  He tried Mobic today without relief. Also ice pack/theragun.  He has been following Dr Benz for chronic neck issues over the years, gets PT periodically.   He is retired due to a work injury.

## 2025-02-12 ENCOUNTER — RX ONLY (RX ONLY)
Age: 60
End: 2025-02-12

## 2025-02-12 ENCOUNTER — OFFICE (OUTPATIENT)
Dept: URBAN - METROPOLITAN AREA CLINIC 63 | Facility: CLINIC | Age: 60
Setting detail: OPHTHALMOLOGY
End: 2025-02-12
Payer: MEDICARE

## 2025-02-12 DIAGNOSIS — B30.9: ICD-10-CM

## 2025-02-12 PROCEDURE — 92002 INTRM OPH EXAM NEW PATIENT: CPT | Performed by: INTERNAL MEDICINE

## 2025-02-12 ASSESSMENT — TONOMETRY
OD_IOP_MMHG: 16
OS_IOP_MMHG: 16

## 2025-02-12 ASSESSMENT — REFRACTION_AUTOREFRACTION
OS_CYLINDER: -0.25
OS_SPHERE: +0.50
OD_SPHERE: +0.25
OS_AXIS: 145
OD_CYLINDER: -2.25
OD_AXIS: 95

## 2025-02-12 ASSESSMENT — KERATOMETRY
OS_K1POWER_DIOPTERS: 44.50
OS_K2POWER_DIOPTERS: 45.25
OS_AXISANGLE_DEGREES: 156
OD_K1POWER_DIOPTERS: 45.00
OD_AXISANGLE_DEGREES: 100
OD_K2POWER_DIOPTERS: 46.50
METHOD_AUTO_MANUAL: AUTO

## 2025-02-12 ASSESSMENT — CONFRONTATIONAL VISUAL FIELD TEST (CVF)
OS_FINDINGS: FULL
OD_FINDINGS: FULL

## 2025-02-12 ASSESSMENT — VISUAL ACUITY
OS_BCVA: 20/40
OD_BCVA: 20/20

## 2025-03-07 ENCOUNTER — OFFICE (OUTPATIENT)
Dept: URBAN - METROPOLITAN AREA CLINIC 104 | Facility: CLINIC | Age: 60
Setting detail: OPHTHALMOLOGY
End: 2025-03-07
Payer: MEDICARE

## 2025-03-07 ENCOUNTER — RX ONLY (RX ONLY)
Age: 60
End: 2025-03-07

## 2025-03-07 DIAGNOSIS — H00.15: ICD-10-CM

## 2025-03-07 PROCEDURE — 99213 OFFICE O/P EST LOW 20 MIN: CPT | Performed by: OPTOMETRIST

## 2025-03-07 ASSESSMENT — KERATOMETRY
OD_K1POWER_DIOPTERS: 45.06
OD_AXISANGLE_DEGREES: 014
OD_K2POWER_DIOPTERS: 46.49
OS_K2POWER_DIOPTERS: 45.24
METHOD_AUTO_MANUAL: AUTO
OS_K1POWER_DIOPTERS: 44.58
OS_AXISANGLE_DEGREES: 077

## 2025-03-07 ASSESSMENT — CONFRONTATIONAL VISUAL FIELD TEST (CVF)
OD_FINDINGS: FULL
OS_FINDINGS: FULL

## 2025-03-07 ASSESSMENT — REFRACTION_AUTOREFRACTION
OS_SPHERE: +0.50
OS_CYLINDER: -0.50
OS_AXIS: 111
OD_CYLINDER: -2.25
OD_AXIS: 100
OD_SPHERE: 0.00

## 2025-03-07 ASSESSMENT — VISUAL ACUITY
OS_BCVA: 20/40
OD_BCVA: 20/20-1

## 2025-03-12 ENCOUNTER — OFFICE (OUTPATIENT)
Dept: URBAN - METROPOLITAN AREA CLINIC 104 | Facility: CLINIC | Age: 60
Setting detail: OPHTHALMOLOGY
End: 2025-03-12
Payer: MEDICARE

## 2025-03-12 DIAGNOSIS — H00.15: ICD-10-CM

## 2025-03-12 PROCEDURE — 67801 REMOVE EYELID LESIONS: CPT | Mod: LT | Performed by: OPHTHALMOLOGY

## 2025-03-12 PROCEDURE — 92285 EXTERNAL OCULAR PHOTOGRAPHY: CPT | Performed by: OPHTHALMOLOGY

## 2025-03-12 ASSESSMENT — CONFRONTATIONAL VISUAL FIELD TEST (CVF)
OS_FINDINGS: FULL
OD_FINDINGS: FULL

## 2025-03-12 ASSESSMENT — LID EXAM ASSESSMENTS
OS_BLEPHARITIS: LLL LUL 2+
OD_BLEPHARITIS: RLL RUL 2+

## 2025-03-13 ENCOUNTER — RX ONLY (RX ONLY)
Age: 60
End: 2025-03-13

## 2025-03-13 PROBLEM — H01.004 BLEPHARITIS; RIGHT UPPER LID, RIGHT LOWER LID, LEFT UPPER LID, LEFT LOWER LID: Status: ACTIVE | Noted: 2025-03-12

## 2025-03-13 PROBLEM — H01.005 BLEPHARITIS; RIGHT UPPER LID, RIGHT LOWER LID, LEFT UPPER LID, LEFT LOWER LID: Status: ACTIVE | Noted: 2025-03-12

## 2025-03-13 PROBLEM — H01.001 BLEPHARITIS; RIGHT UPPER LID, RIGHT LOWER LID, LEFT UPPER LID, LEFT LOWER LID: Status: ACTIVE | Noted: 2025-03-12

## 2025-03-13 PROBLEM — H01.002 BLEPHARITIS; RIGHT UPPER LID, RIGHT LOWER LID, LEFT UPPER LID, LEFT LOWER LID: Status: ACTIVE | Noted: 2025-03-12

## 2025-03-13 ASSESSMENT — KERATOMETRY
OD_K1POWER_DIOPTERS: 45.06
OS_K2POWER_DIOPTERS: 45.24
OD_AXISANGLE_DEGREES: 014
METHOD_AUTO_MANUAL: AUTO
OS_AXISANGLE_DEGREES: 077
OD_K2POWER_DIOPTERS: 46.49
OS_K1POWER_DIOPTERS: 44.58

## 2025-03-13 ASSESSMENT — REFRACTION_AUTOREFRACTION
OS_CYLINDER: -0.50
OD_SPHERE: 0.00
OD_CYLINDER: -2.25
OS_SPHERE: +0.50
OD_AXIS: 100
OS_AXIS: 111

## 2025-03-13 ASSESSMENT — VISUAL ACUITY
OD_BCVA: 20/20-1
OS_BCVA: 20/40

## 2025-04-22 ENCOUNTER — OFFICE (OUTPATIENT)
Dept: URBAN - METROPOLITAN AREA CLINIC 104 | Facility: CLINIC | Age: 60
Setting detail: OPHTHALMOLOGY
End: 2025-04-22
Payer: MEDICARE

## 2025-04-22 ENCOUNTER — RX ONLY (RX ONLY)
Age: 60
End: 2025-04-22

## 2025-04-22 DIAGNOSIS — H35.63: ICD-10-CM

## 2025-04-22 DIAGNOSIS — H01.001: ICD-10-CM

## 2025-04-22 DIAGNOSIS — H25.13: ICD-10-CM

## 2025-04-22 DIAGNOSIS — H01.005: ICD-10-CM

## 2025-04-22 DIAGNOSIS — H16.223: ICD-10-CM

## 2025-04-22 DIAGNOSIS — H01.004: ICD-10-CM

## 2025-04-22 DIAGNOSIS — H52.4: ICD-10-CM

## 2025-04-22 DIAGNOSIS — H01.002: ICD-10-CM

## 2025-04-22 PROCEDURE — 92250 FUNDUS PHOTOGRAPHY W/I&R: CPT | Performed by: OPTOMETRIST

## 2025-04-22 PROCEDURE — 92014 COMPRE OPH EXAM EST PT 1/>: CPT | Performed by: OPTOMETRIST

## 2025-04-22 PROCEDURE — 92015 DETERMINE REFRACTIVE STATE: CPT | Performed by: OPTOMETRIST

## 2025-04-22 ASSESSMENT — REFRACTION_AUTOREFRACTION
OS_SPHERE: +0.50
OD_CYLINDER: -2.50
OS_AXIS: 116
OS_CYLINDER: -0.75
OD_AXIS: 100
OD_SPHERE: +0.50

## 2025-04-22 ASSESSMENT — KERATOMETRY
OS_AXISANGLE_DEGREES: 070
OD_AXISANGLE_DEGREES: 015
OS_K2POWER_DIOPTERS: 45.06
OD_K2POWER_DIOPTERS: 46.30
OS_K1POWER_DIOPTERS: 44.35
OD_K1POWER_DIOPTERS: 44.04

## 2025-04-22 ASSESSMENT — REFRACTION_MANIFEST
OS_ADD: +1.75
OD_ADD: +1.75
OD_VA2: 20/20(J1+)
OS_SPHERE: +0.50
OS_CYLINDER: -0.50
OD_ADD: +1.75
OD_SPHERE: +0.50
OS_VA1: 20/20
OD_VA1: 20/25
OD_VA1: 20/25
OS_VA1: 20/20
OD_AXIS: 105
OD_CYLINDER: -1.25
OS_VA2: 20/20(J1+)
OD_SPHERE: PLANO
OS_AXIS: 115
OS_ADD: +1.75
OS_CYLINDER: -0.50
OD_AXIS: 105
OS_AXIS: 115
OD_CYLINDER: -2.25
OS_SPHERE: +0.50
OS_VA2: 20/20(J1+)
OD_VA2: 20/20(J1+)

## 2025-04-22 ASSESSMENT — REFRACTION_CURRENTRX
OD_SPHERE: +1.25
OS_SPHERE: +1.50
OD_CYLINDER: -1.25
OD_AXIS: 109
OS_OVR_VA: 20/
OD_OVR_VA: 20/

## 2025-04-22 ASSESSMENT — CONFRONTATIONAL VISUAL FIELD TEST (CVF)
OD_FINDINGS: FULL
OS_FINDINGS: FULL

## 2025-04-22 ASSESSMENT — SUPERFICIAL PUNCTATE KERATITIS (SPK)
OS_SPK: T
OD_SPK: T

## 2025-04-22 ASSESSMENT — LID EXAM ASSESSMENTS
OS_BLEPHARITIS: LLL LUL 2+
OD_BLEPHARITIS: RLL RUL 2+

## 2025-04-22 ASSESSMENT — VISUAL ACUITY
OS_BCVA: 20/40
OD_BCVA: 20/20

## 2025-04-30 ENCOUNTER — APPOINTMENT (OUTPATIENT)
Dept: ORTHOPEDIC SURGERY | Facility: CLINIC | Age: 60
End: 2025-04-30
Payer: MEDICARE

## 2025-04-30 ENCOUNTER — RESULT REVIEW (OUTPATIENT)
Age: 60
End: 2025-04-30

## 2025-04-30 VITALS — HEIGHT: 74 IN | WEIGHT: 215 LBS | BODY MASS INDEX: 27.59 KG/M2

## 2025-04-30 DIAGNOSIS — M54.12 RADICULOPATHY, CERVICAL REGION: ICD-10-CM

## 2025-04-30 PROCEDURE — 99214 OFFICE O/P EST MOD 30 MIN: CPT

## 2025-04-30 RX ORDER — CYCLOBENZAPRINE HYDROCHLORIDE 5 MG/1
5 TABLET, FILM COATED ORAL
Qty: 30 | Refills: 0 | Status: ACTIVE | COMMUNITY
Start: 2025-04-30 | End: 1900-01-01

## 2025-04-30 RX ORDER — METHYLPREDNISOLONE 4 MG/1
4 TABLET ORAL
Qty: 1 | Refills: 0 | Status: ACTIVE | COMMUNITY
Start: 2025-04-30 | End: 1900-01-01

## 2025-05-05 ENCOUNTER — APPOINTMENT (OUTPATIENT)
Dept: ORTHOPEDIC SURGERY | Facility: CLINIC | Age: 60
End: 2025-05-05
Payer: MEDICARE

## 2025-05-05 VITALS — BODY MASS INDEX: 27.59 KG/M2 | WEIGHT: 215 LBS | HEIGHT: 74 IN

## 2025-05-05 DIAGNOSIS — M54.2 CERVICALGIA: ICD-10-CM

## 2025-05-05 DIAGNOSIS — M79.10 MYALGIA, UNSPECIFIED SITE: ICD-10-CM

## 2025-05-05 DIAGNOSIS — M48.02 SPINAL STENOSIS, CERVICAL REGION: ICD-10-CM

## 2025-05-05 PROCEDURE — 99214 OFFICE O/P EST MOD 30 MIN: CPT | Mod: 25

## 2025-05-05 PROCEDURE — J3490M: CUSTOM | Mod: NC,JZ

## 2025-05-05 PROCEDURE — 20552 NJX 1/MLT TRIGGER POINT 1/2: CPT

## 2025-05-07 ENCOUNTER — APPOINTMENT (OUTPATIENT)
Dept: ORTHOPEDIC SURGERY | Facility: CLINIC | Age: 60
End: 2025-05-07

## 2025-05-15 ENCOUNTER — APPOINTMENT (OUTPATIENT)
Dept: PAIN MANAGEMENT | Facility: CLINIC | Age: 60
End: 2025-05-15

## 2025-06-04 ENCOUNTER — RX RENEWAL (OUTPATIENT)
Age: 60
End: 2025-06-04